# Patient Record
Sex: FEMALE | Race: OTHER | HISPANIC OR LATINO | Employment: STUDENT | URBAN - METROPOLITAN AREA
[De-identification: names, ages, dates, MRNs, and addresses within clinical notes are randomized per-mention and may not be internally consistent; named-entity substitution may affect disease eponyms.]

---
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
## 2023-01-04 ENCOUNTER — APPOINTMENT (OUTPATIENT)
Dept: LAB | Age: 18
End: 2023-01-04

## 2023-01-04 LAB
ALBUMIN SERPL BCP-MCNC: 3.4 G/DL (ref 3.5–5)
ALP SERPL-CCNC: 87 U/L (ref 46–384)
ALT SERPL W P-5'-P-CCNC: 26 U/L (ref 12–78)
ANION GAP SERPL CALCULATED.3IONS-SCNC: 9 MMOL/L (ref 4–13)
AST SERPL W P-5'-P-CCNC: 18 U/L (ref 5–45)
BASOPHILS # BLD AUTO: 0.01 THOUSANDS/ÂΜL (ref 0–0.1)
BASOPHILS NFR BLD AUTO: 0 % (ref 0–1)
BILIRUB SERPL-MCNC: 0.27 MG/DL (ref 0.2–1)
BUN SERPL-MCNC: 7 MG/DL (ref 5–25)
CALCIUM ALBUM COR SERPL-MCNC: 9.7 MG/DL (ref 8.3–10.1)
CALCIUM SERPL-MCNC: 9.2 MG/DL (ref 8.3–10.1)
CHLORIDE SERPL-SCNC: 108 MMOL/L (ref 96–108)
CO2 SERPL-SCNC: 22 MMOL/L (ref 21–32)
CREAT SERPL-MCNC: 0.62 MG/DL (ref 0.6–1.3)
EOSINOPHIL # BLD AUTO: 0.07 THOUSAND/ÂΜL (ref 0–0.61)
EOSINOPHIL NFR BLD AUTO: 1 % (ref 0–6)
ERYTHROCYTE [DISTWIDTH] IN BLOOD BY AUTOMATED COUNT: 12.2 % (ref 11.6–15.1)
GFR SERPL CREATININE-BSD FRML MDRD: 131 ML/MIN/1.73SQ M
GLUCOSE P FAST SERPL-MCNC: 143 MG/DL (ref 65–99)
HCT VFR BLD AUTO: 34.8 % (ref 34.8–46.1)
HGB BLD-MCNC: 11.3 G/DL (ref 11.5–15.4)
IMM GRANULOCYTES # BLD AUTO: 0.03 THOUSAND/UL (ref 0–0.2)
IMM GRANULOCYTES NFR BLD AUTO: 1 % (ref 0–2)
LYMPHOCYTES # BLD AUTO: 1.58 THOUSANDS/ÂΜL (ref 0.6–4.47)
LYMPHOCYTES NFR BLD AUTO: 24 % (ref 14–44)
MCH RBC QN AUTO: 29.7 PG (ref 26.8–34.3)
MCHC RBC AUTO-ENTMCNC: 32.5 G/DL (ref 31.4–37.4)
MCV RBC AUTO: 91 FL (ref 82–98)
MONOCYTES # BLD AUTO: 0.57 THOUSAND/ÂΜL (ref 0.17–1.22)
MONOCYTES NFR BLD AUTO: 9 % (ref 4–12)
NEUTROPHILS # BLD AUTO: 4.37 THOUSANDS/ÂΜL (ref 1.85–7.62)
NEUTS SEG NFR BLD AUTO: 65 % (ref 43–75)
NRBC BLD AUTO-RTO: 0 /100 WBCS
PLATELET # BLD AUTO: 243 THOUSANDS/UL (ref 149–390)
PMV BLD AUTO: 10.1 FL (ref 8.9–12.7)
POTASSIUM SERPL-SCNC: 3.3 MMOL/L (ref 3.5–5.3)
PROT SERPL-MCNC: 7.4 G/DL (ref 6.4–8.4)
RBC # BLD AUTO: 3.81 MILLION/UL (ref 3.81–5.12)
SODIUM SERPL-SCNC: 139 MMOL/L (ref 135–147)
WBC # BLD AUTO: 6.63 THOUSAND/UL (ref 4.31–10.16)

## 2023-01-05 ENCOUNTER — PATIENT OUTREACH (OUTPATIENT)
Dept: SURGERY | Facility: CLINIC | Age: 18
End: 2023-01-05

## 2023-01-05 LAB
BASOPHILS # BLD AUTO: 0 X10E3/UL (ref 0–0.2)
BASOPHILS NFR BLD AUTO: 1 %
CD3+CD4+ CELLS # BLD: 609 /UL (ref 359–1519)
CD3+CD4+ CELLS NFR BLD: 43.5 % (ref 30.8–58.5)
CD3+CD4+ CELLS/CD3+CD8+ CLL BLD: 1.34 % (ref 0.92–3.72)
CD3+CD8+ CELLS # BLD: 454 /UL (ref 109–897)
CD3+CD8+ CELLS NFR BLD: 32.4 % (ref 12–35.5)
EOSINOPHIL # BLD AUTO: 0.1 X10E3/UL (ref 0–0.4)
EOSINOPHIL NFR BLD AUTO: 1 %
ERYTHROCYTE [DISTWIDTH] IN BLOOD BY AUTOMATED COUNT: 12.7 % (ref 11.7–15.4)
HCT VFR BLD AUTO: 33.6 % (ref 34–46.6)
HGB BLD-MCNC: 11.2 G/DL (ref 11.1–15.9)
IMM GRANULOCYTES # BLD: 0 X10E3/UL (ref 0–0.1)
IMM GRANULOCYTES NFR BLD: 0 %
LYMPHOCYTES # BLD AUTO: 1.4 X10E3/UL (ref 0.7–3.1)
LYMPHOCYTES NFR BLD AUTO: 22 %
MCH RBC QN AUTO: 29.9 PG (ref 26.6–33)
MCHC RBC AUTO-ENTMCNC: 33.3 G/DL (ref 31.5–35.7)
MCV RBC AUTO: 90 FL (ref 79–97)
MONOCYTES # BLD AUTO: 0.6 X10E3/UL (ref 0.1–0.9)
MONOCYTES NFR BLD AUTO: 9 %
NEUTROPHILS # BLD AUTO: 4.2 X10E3/UL (ref 1.4–7)
NEUTROPHILS NFR BLD AUTO: 67 %
PLATELET # BLD AUTO: 230 X10E3/UL (ref 150–450)
RBC # BLD AUTO: 3.74 X10E6/UL (ref 3.77–5.28)
WBC # BLD AUTO: 6.2 X10E3/UL (ref 3.4–10.8)

## 2023-01-06 LAB
HIV1 RNA # SERPL NAA+PROBE: <20 COPIES/ML
HIV1 RNA SERPL NAA+PROBE-LOG#: NORMAL LOG10COPY/ML

## 2023-01-10 NOTE — PROGRESS NOTES
Ct called Cm to state that she would like her labs to be put in   Ct was discussed at team meeting and Cm notified team

## 2023-01-16 ENCOUNTER — TELEPHONE (OUTPATIENT)
Dept: SURGERY | Facility: CLINIC | Age: 18
End: 2023-01-16

## 2023-01-16 ENCOUNTER — PATIENT OUTREACH (OUTPATIENT)
Dept: SURGERY | Facility: CLINIC | Age: 18
End: 2023-01-16

## 2023-01-16 NOTE — PROGRESS NOTES
Ct reports to be a heterosexual with no sexual partners  Ct denies having sex for drugs or money  Ct also denies having sex while using drugs and alcohol  Ct denies drug or alcohol abuse  Ct reports to not have anyone to disclose HIV/STD status to  Ct denies being a victim of sexual abuse/assault  Ct reports to be going to [email or web address] for dental care  Ct reports to have dental insurance coverage  Ct reports to have gone to the dentist 12/23/2022 for a cavity fill  Ct denies any dental concerns  Ct reports to have [employer] for health insurance  Ct reports to have gotten labs done during the first week of January  Ct reports to be taking one medication pill for HIV  Ct reports to be taking Odefsey  Ct denies missing medication doses within the last month  Cm completed acuity scale with ct  Ct has scored a 12  This leaves ct at a level 1  See Attached  Ct has also inquired with cm about being sick since 12/30/2022  Ct has reported having a cough that has been lingering  Cm has recommended that ct goes to an express care within [facility] to inquire about cough

## 2023-01-16 NOTE — PROGRESS NOTES
Cm contacted ct after seeing a missed call from 01/14/2023  Ct informed that ct's inquiries were resolved with Medical Assistant  Cm acknowledged and inquired with ct about completing six month follow up  Ct reported to currently be at work and will get in contact with cm once work is over  Cm acknowledged

## 2023-01-16 NOTE — TELEPHONE ENCOUNTER
Pt left a message on Saturday saying she needs to get 2 shots for a volunteer opportunity, TB was one, and she said she  couldn't pronounce the other one  Ana Lazcano can you please call her back?  Thanks

## 2023-01-24 ENCOUNTER — OFFICE VISIT (OUTPATIENT)
Dept: SURGERY | Facility: CLINIC | Age: 18
End: 2023-01-24

## 2023-01-24 ENCOUNTER — PATIENT OUTREACH (OUTPATIENT)
Dept: SURGERY | Facility: CLINIC | Age: 18
End: 2023-01-24

## 2023-01-24 VITALS
HEART RATE: 91 BPM | TEMPERATURE: 99.2 F | HEIGHT: 65 IN | SYSTOLIC BLOOD PRESSURE: 111 MMHG | BODY MASS INDEX: 25.18 KG/M2 | DIASTOLIC BLOOD PRESSURE: 57 MMHG | WEIGHT: 151.1 LBS

## 2023-01-24 DIAGNOSIS — Z23 NEED FOR INFLUENZA VACCINATION: ICD-10-CM

## 2023-01-24 DIAGNOSIS — J06.9 UPPER RESPIRATORY TRACT INFECTION, UNSPECIFIED TYPE: ICD-10-CM

## 2023-01-24 DIAGNOSIS — E87.6 HYPOKALEMIA: ICD-10-CM

## 2023-01-24 DIAGNOSIS — D72.819 LEUKOPENIA, UNSPECIFIED TYPE: ICD-10-CM

## 2023-01-24 DIAGNOSIS — Z23 NEED FOR PNEUMOCOCCAL VACCINATION: ICD-10-CM

## 2023-01-24 DIAGNOSIS — B20 HIV INFECTION, UNSPECIFIED SYMPTOM STATUS (HCC): Primary | ICD-10-CM

## 2023-01-24 DIAGNOSIS — H66.91 CHRONIC INFECTION OF RIGHT EAR: ICD-10-CM

## 2023-01-24 NOTE — PROGRESS NOTES
Ct has came into office for assistance with completing benefits renewal  Ct got to formally meet with cm  Cm assisted ct with benefit renewal and has faxed over to [facility]  Cm has handed completed paperwork back to ct after faxing it to [facility]  Ct has also signed grievance policy and ct/cm agreement

## 2023-01-24 NOTE — PROGRESS NOTES
Progress Note - Infectious Disease   Vikash Vieira 21 y o  female MRN: [id number]  Unit/Bed#:  Encounter: [id number]      Impression/Plan:  1  HIV-congenitally infected   Doing well on Odefsey with an undetectable viral load and a CD4 count of 609  holding off on changing to injectable cabenuva for now  Continue odefsey, recheck labs in 2 months, follow-up in 3 months  Stressed adherence  Asked the patient to start setting her cell phone alarm for the same time of her dose daily     2  Recurrent otitis externa-resolved  Will monitor for now      3  Leukopenia-resolved  We will continue to monitor     4  Contact dermatitis-resolved  5   Upper respiratory infection  Now resolved  No additional work-up or treatment    6  Hypokalemia  Patient to increase fresh fruits and vegetables and will provide dietary information to the patient for foods higher in potassium such as bananas and potatoes      Patient was provided medication, adherence and prevention education    Subjective:  Routine follow-up for HIV  Patient claims 100% near adherence with Odefsey  She did have a period where she missed a number of doses when she was working and would fall asleep patient denies any notable side effects  Overall the feeling well  The patient denies any fever chills or sweats, denies any nausea vomiting or diarrhea, denies any cough or shortness of breath  Patient did have a prolonged respiratory illness with a long-term cough that now seems to have resolved  ROS:  A complete review of systems is negative other than that noted above in the subjective    Followup portions patient history reviewed and updated as:   Allergies, current medications, past medical history, past social history, past surgical history, and the problem list    Objective:  Vitals:  Vitals:    01/24/23 1615   BP: 111/57   Pulse: 91   Temp: 99 2 °F (37 3 °C)   Weight: 68 5 kg (151 lb 1 6 oz)   Height: 5' 5" (1 651 m) Physical Exam:   General Appearance:  Alert, interactive, appearing well,  nontoxic, no acute distress  Neck:   Supple without lymphadenopathy, no thyromegaly or masses   Throat: Oropharynx moist without lesions  Lungs:   Clear to auscultation bilaterally; no wheezes, rhonchi or rales; respirations unlabored   Heart:  RRR; no murmur, rub or gallop   Abdomen:   Soft, non-tender, non-distended, positive bowel sounds  Extremities: No clubbing, cyanosis or edema   Skin: No new rashes or lesions  No draining wounds noted  Labs, Imaging, & Other studies:   All pertinent labs and imaging studies were personally reviewed    Lab Results   Component Value Date    K 3 3 (L) 01/04/2023     01/04/2023    CO2 22 01/04/2023    BUN 7 01/04/2023    CREATININE 0 62 01/04/2023    GLUF 143 (H) 01/04/2023    CALCIUM 9 2 01/04/2023    CORRECTEDCA 9 7 01/04/2023    AST 18 01/04/2023    ALT 26 01/04/2023    ALKPHOS 87 01/04/2023    EGFR 131 01/04/2023     Lab Results   Component Value Date    WBC 6 63 01/04/2023    WBC 6 2 01/04/2023    HGB 11 3 (L) 01/04/2023    HGB 11 2 01/04/2023    HCT 34 8 01/04/2023    HCT 33 6 (L) 01/04/2023    MCV 91 01/04/2023    MCV 90 01/04/2023     01/04/2023     01/04/2023     Lab Results   Component Value Date    HEPCAB Non-reactive 05/14/2022     Lab Results   Component Value Date    HAV Reactive (A) 05/14/2022    HEPCAB Non-reactive 05/14/2022     Lab Results   Component Value Date    RPR Non-Reactive 05/14/2022     CD4 ABS   Date/Time Value Ref Range Status   01/04/2023 08:56  359 - 1519 /uL Final     HIV-1 RNA by PCR, Qn   Date/Time Value Ref Range Status   01/04/2023 08:56 AM <20 copies/mL Final     Comment:     HIV-1 RNA not detected  The reportable range for this assay is 20 to 10,000,000  copies HIV-1 RNA/mL             Current Outpatient Medications:   •  Acetaminophen-Caff-Pyrilamine 637-59-27 MG TABS, Take 1 tablet by mouth 2 (two) times a day as needed (for period symptoms), Disp: 60 tablet, Rfl: 2  •  Zmqqrzbsif-Zrkzilec-Fzrdlvu AF (Odefsey) tablet, Take 1 tablet by mouth daily, Disp: 30 tablet, Rfl: 5  •  melatonin 3 mg, TAKE 1 TABLET BY MOUTH DAILY AT BEDTIME, Disp: 30 tablet, Rfl: 0  •  ondansetron (ZOFRAN) 4 mg tablet, TAKE 1 TABLET BY MOUTH EVERY EIGHT HOURS AS NEEDED FOR NAUSEA OR VOMITING, Disp: 20 tablet, Rfl: 0  •  BinaxNOW COVID-19 Ag Home Test KIT, TEST AS DIRECTED TODAY (Patient not taking: Reported on 1/24/2023), Disp: , Rfl:

## 2023-01-26 ENCOUNTER — TELEPHONE (OUTPATIENT)
Dept: SURGERY | Facility: CLINIC | Age: 18
End: 2023-01-26

## 2023-01-26 NOTE — TELEPHONE ENCOUNTER
Pt called this morning because she is having a reaction to the vaccine she was given on Tuesday  She has apparently had reactions to the flu shot before, but feels this is worse than usual  Her are is swollen, and there is a rash, it's also painful, though she did say it hurts less today than yesterday

## 2023-02-10 ENCOUNTER — PATIENT OUTREACH (OUTPATIENT)
Dept: SURGERY | Facility: CLINIC | Age: 18
End: 2023-02-10

## 2023-02-10 DIAGNOSIS — B20 HIV INFECTION, UNSPECIFIED SYMPTOM STATUS (HCC): ICD-10-CM

## 2023-02-14 RX ORDER — EMTRICITABINE, RILPIVIRINE, AND TENOFOVIR ALAFENAMIDE 200; 25; 25 MG/1; MG/1; MG/1
1 TABLET ORAL DAILY
Qty: 30 TABLET | Refills: 5 | Status: SHIPPED | OUTPATIENT
Start: 2023-02-14

## 2023-02-21 ENCOUNTER — PATIENT OUTREACH (OUTPATIENT)
Dept: SURGERY | Facility: CLINIC | Age: 18
End: 2023-02-21

## 2023-03-02 ENCOUNTER — PATIENT OUTREACH (OUTPATIENT)
Dept: SURGERY | Facility: CLINIC | Age: 18
End: 2023-03-02

## 2023-03-10 ENCOUNTER — OFFICE VISIT (OUTPATIENT)
Dept: SURGERY | Facility: CLINIC | Age: 18
End: 2023-03-10

## 2023-03-10 ENCOUNTER — DOCUMENTATION (OUTPATIENT)
Dept: SURGERY | Facility: CLINIC | Age: 18
End: 2023-03-10

## 2023-03-10 VITALS
WEIGHT: 141 LBS | TEMPERATURE: 98.4 F | DIASTOLIC BLOOD PRESSURE: 60 MMHG | HEART RATE: 78 BPM | SYSTOLIC BLOOD PRESSURE: 100 MMHG | OXYGEN SATURATION: 100 % | HEIGHT: 65 IN | BODY MASS INDEX: 23.49 KG/M2

## 2023-03-10 DIAGNOSIS — R63.4 WEIGHT LOSS, UNINTENTIONAL: ICD-10-CM

## 2023-03-10 DIAGNOSIS — R11.0 NAUSEA: ICD-10-CM

## 2023-03-10 DIAGNOSIS — B20 HIV INFECTION, UNSPECIFIED SYMPTOM STATUS (HCC): Primary | ICD-10-CM

## 2023-03-10 DIAGNOSIS — Z23 NEED FOR HPV VACCINE: ICD-10-CM

## 2023-03-10 NOTE — ASSESSMENT & PLAN NOTE
No results found for: [id number]  CD4 ABS   Date/Time Value Ref Range Status   2023 08:56  359 - 1519 /uL Final     HIV-1 RNA by PCR, Qn   Date/Time Value Ref Range Status   2023 08:56 AM <20 copies/mL Final     Comment:     HIV-1 RNA not detected  The reportable range for this assay is 20 to 10,000,000  copies HIV-1 RNA/mL  HIV-1 RNA Viral Load Log   Date/Time Value Ref Range Status   2023 08:56 AM COMMENT [id number]/mL Final     Comment:     Unable to calculate result since non-numeric result obtained for  component test          ART: Donavan Leung        Denies side effects  Stressed the importance of adherence  Continue follow up with ID clinic  Reviewed most recent labs, including Cd4 and viral load  Discussed the risks and benefits of treatment options, instructions for management, importance of treatment adherence, and reduction of risk factor  Educated on possible  medication side effects  Counseled on routes of HIV transmission, including the risk of  infection  Emphasized that viral suppression is the best method to prevent HIV transmission  At this time pt denies the need for HIV testing of anyone in their life  Total encounter time was 45 minutes  Greater then 20 minutes were spent on counseling and patient education  Pt voices understanding and agreement with treatment plan

## 2023-03-10 NOTE — PROGRESS NOTES
Assessment/Plan:    Nausea  Educated to continue ondansetron as needed  Take medication 20 minutes prior to meals  Eat small meals as tolerated  Provided with sample nutritional supplement  Discussed the possibility of dietary intolerance of lactose or gluten  Advised to avoid lactose in her diet for the next 30 days and see if that makes a difference  Refer to dietitian for additional education  Weight loss, unintentional  Body mass index is 23 46 kg/m²  Wt Readings from Last 3 Encounters:   03/10/23 64 kg (141 lb) (70 %, Z= 0 53)*   01/24/23 68 5 kg (151 lb 1 6 oz) (81 %, Z= 0 88)*   08/19/22 72 1 kg (159 lb) (87 %, Z= 1 13)*     * Growth percentiles are based on CDC (Girls, 2-20 Years) data  Height: 5' 5" (165 1 cm)           Lab Results   Component Value Date    HGBA1C 5 0 05/14/2022     No results found for: GLUCOSE; To consume high-calorie snacks as tolerated  Given sample of nutritional supplement  Refer to GI       HIV (human immunodeficiency virus infection) ([facility], [place] [address] )  No results found for: [id number]  CD4 ABS   Date/Time Value Ref Range Status   01/04/2023 08:56  359 - 1519 /uL Final     HIV-1 RNA by PCR, Qn   Date/Time Value Ref Range Status   01/04/2023 08:56 AM <20 copies/mL Final     Comment:     HIV-1 RNA not detected  The reportable range for this assay is 20 to 10,000,000  copies HIV-1 RNA/mL  HIV-1 RNA Viral Load Log   Date/Time Value Ref Range Status   01/04/2023 08:56 AM COMMENT [id number]/mL Final     Comment:     Unable to calculate result since non-numeric result obtained for  component test          ART: Lindsay Day        Denies side effects  Stressed the importance of adherence  Continue follow up with ID clinic  Reviewed most recent labs, including Cd4 and viral load  Discussed the risks and benefits of treatment options, instructions for management, importance of treatment adherence, and reduction of risk factor  Educated on possible  medication side effects  Counseled on routes of HIV transmission, including the risk of  infection  Emphasized that viral suppression is the best method to prevent HIV transmission  At this time pt denies the need for HIV testing of anyone in their life  Total encounter time was 45 minutes  Greater then 20 minutes were spent on counseling and patient education  Pt voices understanding and agreement with treatment plan  Diagnoses and all orders for this visit:    HIV infection, unspecified symptom status ([facility] [address] )    Need for HPV vaccine  -     HPV VACCINE 9 VALENT IM    Nausea  -     CBC and differential; Future  -     Ferritin; Future  -     Vitamin B12; Future  -     Vitamin D 25 hydroxy; Future  -     Ambulatory referral to Gastroenterology; Future  -     Comprehensive metabolic panel; Future  -     Ambulatory referral to Gastroenterology; Future  -     TSH, 3rd generation with Free T4 reflex; Future  -     HEMOGLOBIN A1C W/ EAG ESTIMATION; Future  -     Lipase; Future    Weight loss, unintentional  -     CBC and differential; Future  -     Ferritin; Future  -     Vitamin B12; Future  -     Vitamin D 25 hydroxy; Future  -     Ambulatory referral to Gastroenterology; Future  -     Comprehensive metabolic panel; Future  -     Ambulatory referral to Gastroenterology; Future  -     TSH, 3rd generation with Free T4 reflex; Future  -     HEMOGLOBIN A1C W/ EAG ESTIMATION; Future  -     Lipase; Future          Subjective:      Patient ID: Fide Wylie is a 21 y o  female  Shannan Sweeney is a pleasant 21year old female who presents to the clinic today for PCP follow-up visit  Past medical history significant for HIV and chronic otitis externa  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  She is complaining of persistent nausea with unintentional weight loss of 10 pounds over the past 6 months  She denies vomiting, diarrhea, abdominal pain, or fever  This has been an ongoing issue for her    Previously she complained of nausea with her menstrual cycles and was prescribed Zofran which was effective  Now she reports increased nauseousness and decreased appetite with intolerability to protein  She does not have food insecurity or history of anorexia/bulimia  She does have a family history of maternal grandmother with rectal cancer and paternal grandmother with stomach cancer  She has never had a formal GI work-up  The following portions of the patient's history were reviewed and updated as appropriate: allergies, current medications, past family history, past medical history, past social history, past surgical history and problem list     Review of Systems   Constitutional: Positive for appetite change and unexpected weight change  Negative for activity change, chills, fatigue and fever  HENT: Negative for ear pain and sore throat  Eyes: Negative for pain and visual disturbance  Respiratory: Negative for cough and shortness of breath  Cardiovascular: Negative for chest pain and palpitations  Gastrointestinal: Positive for nausea  Negative for abdominal distention, abdominal pain, constipation, diarrhea and vomiting  Genitourinary: Negative for dysuria and hematuria  Musculoskeletal: Negative for arthralgias and back pain  Skin: Negative for color change and rash  Neurological: Negative for seizures and syncope  All other systems reviewed and are negative          Objective:      /60   Pulse 78   Temp 98 4 °F (36 9 °C)   Ht 5' 5" (1 651 m)   Wt 64 kg (141 lb)   SpO2 100%   BMI 23 46 kg/m²       Lab Results   Component Value Date    K 3 3 (L) 01/04/2023     01/04/2023    CO2 22 01/04/2023    BUN 7 01/04/2023    CREATININE 0 62 01/04/2023    GLUF 143 (H) 01/04/2023    CALCIUM 9 2 01/04/2023    CORRECTEDCA 9 7 01/04/2023    AST 18 01/04/2023    ALT 26 01/04/2023    ALKPHOS 87 01/04/2023    EGFR 131 01/04/2023     Lab Results   Component Value Date    WBC 6 63 01/04/2023    WBC 6 2 01/04/2023    HGB 11 3 (L) 01/04/2023    HGB 11 2 01/04/2023    HCT 34 8 01/04/2023    HCT 33 6 (L) 01/04/2023    MCV 91 01/04/2023    MCV 90 01/04/2023     01/04/2023     01/04/2023     Lab Results   Component Value Date    HEPCAB Non-reactive 05/14/2022     Lab Results   Component Value Date    HAV Reactive (A) 05/14/2022    HEPCAB Non-reactive 05/14/2022     Lab Results   Component Value Date    RPR Non-Reactive 05/14/2022            Physical Exam  Constitutional:       General: She is not in acute distress  Appearance: She is well-developed  HENT:      Head: Normocephalic and atraumatic  Right Ear: External ear normal       Left Ear: External ear normal       Nose: Nose normal       Mouth/Throat:      Pharynx: No oropharyngeal exudate  Eyes:      General:         Right eye: No discharge  Left eye: No discharge  Conjunctiva/sclera: Conjunctivae normal       Pupils: Pupils are equal, round, and reactive to light  Neck:      Thyroid: No thyromegaly  Cardiovascular:      Rate and Rhythm: Normal rate and regular rhythm  Heart sounds: Normal heart sounds  No murmur heard  Pulmonary:      Effort: Pulmonary effort is normal       Breath sounds: Normal breath sounds  No wheezing  Abdominal:      General: Bowel sounds are normal       Palpations: Abdomen is soft  There is no mass  Tenderness: There is no abdominal tenderness  Musculoskeletal:         General: No tenderness  Normal range of motion  Cervical back: Normal range of motion  Lymphadenopathy:      Cervical: No cervical adenopathy  Skin:     General: Skin is warm and dry  Findings: No rash  Neurological:      Mental Status: She is alert and oriented to person, place, and time     Psychiatric:         Behavior: Behavior normal

## 2023-03-10 NOTE — ASSESSMENT & PLAN NOTE
Body mass index is 23 46 kg/m²  Wt Readings from Last 3 Encounters:   03/10/23 64 kg (141 lb) (70 %, Z= 0 53)*   01/24/23 68 5 kg (151 lb 1 6 oz) (81 %, Z= 0 88)*   08/19/22 72 1 kg (159 lb) (87 %, Z= 1 13)*     * Growth percentiles are based on CDC (Girls, 2-20 Years) data  Height: 5' 5" (165 1 cm)           Lab Results   Component Value Date    HGBA1C 5 0 05/14/2022     No results found for: GLUCOSE; To consume high-calorie snacks as tolerated  Given sample of nutritional supplement  Refer to GI

## 2023-03-10 NOTE — ASSESSMENT & PLAN NOTE
Educated to continue ondansetron as needed  Take medication 20 minutes prior to meals  Eat small meals as tolerated  Provided with sample nutritional supplement  Discussed the possibility of dietary intolerance of lactose or gluten  Advised to avoid lactose in her diet for the next 30 days and see if that makes a difference  Refer to dietitian for additional education

## 2023-03-10 NOTE — PROGRESS NOTES
Assessment    Vikash seen by RD in conjunction with PCP f/u   pt is established patient (last annual was 05/2022)      Clinical Data/Client History    CD4 count:  609  Viral load:  <20  ART:   [id number] [facility]      , Patient Navigator: [id number] Jessica Manleyd assistance: No issues with food access     Living situation: House or apartment     Mobility:  self ambulates    Physical activity: admitted she has not exercised lately     Oral problems: denied difficulties chewing and swallowing       Typical food/beverage intake:    · Breakfast pancakes with eggs   · Lunch chicken & rice, sometimes [employer]   · Dinner skips   · Beverages water, ginger ale, admits she does not drink very much water     Appetite: Good    Vitamin, mineral, herbal supplements: N/A    Oral/enteral nutrition supplements:  N/A    GI problems: nausea and denies any C/D    Food allergies/intolerances:  None reported     Weight history: Wt Readings from Last 3 Encounters:   03/10/23 64 kg (141 lb) (70 %, Z= 0 53)*   01/24/23 68 5 kg (151 lb 1 6 oz) (81 %, Z= 0 88)*   08/19/22 72 1 kg (159 lb) (87 %, Z= 1 13)*     * Growth percentiles are based on CDC (Girls, 2-20 Years) data       Current body weight:  141  Height:  65"  BMI:  23(healthy for age group)   IBW:  125  %IBW  112%    Weight change: -10#(6%) x 2 months, -18#(11%) ~6 months     Clinically significant/severe: unintentional weight loss noted    Nutrition-related labs:    Lab Results   Component Value Date    SODIUM 139 01/04/2023    K 3 3 (L) 01/04/2023     01/04/2023    CO2 22 01/04/2023    BUN 7 01/04/2023    CREATININE 0 62 01/04/2023    GLUC 100 07/06/2022    CALCIUM 9 2 01/04/2023     Lab Results   Component Value Date    HGBA1C 5 0 05/14/2022       Nutrition-related medications: Zofran    Nutrition Diagnosis    Problem: unintended weight loss    Related to: inadequate PO intake     As Evidenced By: Diet recall, nausea, significant weight loss       Estimated Nutritional Needs    [facility] REE: (64kg)     · ~3547-2605 kcal (based on: 25-30kca/kg)  · ~64 protein (based on: 1g/kg)  · ~1600ml fluid (based on: 25ml/kg)      Current intake estimation: not meeting needs      Nutrition Intervention/Recommendations    Nutrition education intervention: provided    Nutrition recommendations: Drink more fluids, take zofran before meals to prevent nausea     Supplement recommendations: supplement samples provided     Teaching Method: verbal     Person Educated: Pt & parent    Comprehension: excellent    Receptivity: excellent    Expected compliance: good      Goals:  Weight stability, decreased nausea   Monitoring/Evaluation:  Will continue to monitor weight trend, appetite, GI issues, lab trend and need for nutrition education  Visit Summary  Significant unintended weight loss noted  PT reports eating less r/t nausea  Pt does not report any GI symptoms(vominting,constipation or diarrhea)  PT does report nausea with meat products but denies any other food intolerances  RD suggested drinking more fluids with electrolytes and taking anti-nausea medication before meals  Labs on order to check for any underlying conditions  Will continue to provided nutrition support as needed  Supplement samples provided     Jesica Mata RDN,LDN

## 2023-04-03 ENCOUNTER — TELEPHONE (OUTPATIENT)
Dept: SURGERY | Facility: CLINIC | Age: 18
End: 2023-04-03

## 2023-04-03 ENCOUNTER — TELEPHONE (OUTPATIENT)
Dept: GASTROENTEROLOGY | Facility: CLINIC | Age: 18
End: 2023-04-03

## 2023-04-03 NOTE — TELEPHONE ENCOUNTER
----- Message from Lenora Bowman sent at 3/31/2023  3:34 PM EDT -----  Regarding: labs  Pt needs labs done by Friday 04/07/23 for appt with dr Bebeto Hall on 04/18/23  87

## 2023-04-03 NOTE — TELEPHONE ENCOUNTER
Called and lvm for the patient to call me back and be rescheduled from her cancelled appointment on 4/5/23

## 2023-04-07 ENCOUNTER — APPOINTMENT (OUTPATIENT)
Dept: LAB | Age: 18
End: 2023-04-07

## 2023-04-07 ENCOUNTER — PATIENT OUTREACH (OUTPATIENT)
Dept: SURGERY | Facility: CLINIC | Age: 18
End: 2023-04-07

## 2023-04-07 DIAGNOSIS — R63.4 WEIGHT LOSS, UNINTENTIONAL: ICD-10-CM

## 2023-04-07 DIAGNOSIS — R11.0 NAUSEA: ICD-10-CM

## 2023-04-07 LAB
25(OH)D3 SERPL-MCNC: 19 NG/ML (ref 30–100)
ALBUMIN SERPL BCP-MCNC: 3.9 G/DL (ref 3.5–5)
ALP SERPL-CCNC: 68 U/L (ref 46–116)
ALT SERPL W P-5'-P-CCNC: 15 U/L (ref 12–78)
ANION GAP SERPL CALCULATED.3IONS-SCNC: 3 MMOL/L (ref 4–13)
AST SERPL W P-5'-P-CCNC: 12 U/L (ref 5–45)
BASOPHILS # BLD AUTO: 0.02 THOUSANDS/ÂΜL (ref 0–0.1)
BASOPHILS NFR BLD AUTO: 1 % (ref 0–1)
BILIRUB SERPL-MCNC: 0.49 MG/DL (ref 0.2–1)
BUN SERPL-MCNC: 8 MG/DL (ref 5–25)
CALCIUM SERPL-MCNC: 9.4 MG/DL (ref 8.3–10.1)
CHLORIDE SERPL-SCNC: 109 MMOL/L (ref 96–108)
CO2 SERPL-SCNC: 24 MMOL/L (ref 21–32)
CREAT SERPL-MCNC: 0.62 MG/DL (ref 0.6–1.3)
EOSINOPHIL # BLD AUTO: 0.02 THOUSAND/ÂΜL (ref 0–0.61)
EOSINOPHIL NFR BLD AUTO: 1 % (ref 0–6)
ERYTHROCYTE [DISTWIDTH] IN BLOOD BY AUTOMATED COUNT: 12.3 % (ref 11.6–15.1)
FERRITIN SERPL-MCNC: 13 NG/ML (ref 8–388)
GFR SERPL CREATININE-BSD FRML MDRD: 130 ML/MIN/1.73SQ M
GLUCOSE P FAST SERPL-MCNC: 83 MG/DL (ref 65–99)
HCT VFR BLD AUTO: 36.2 % (ref 34.8–46.1)
HGB BLD-MCNC: 11.9 G/DL (ref 11.5–15.4)
IMM GRANULOCYTES # BLD AUTO: 0.01 THOUSAND/UL (ref 0–0.2)
IMM GRANULOCYTES NFR BLD AUTO: 0 % (ref 0–2)
LIPASE SERPL-CCNC: 252 U/L (ref 73–393)
LYMPHOCYTES # BLD AUTO: 1.71 THOUSANDS/ÂΜL (ref 0.6–4.47)
LYMPHOCYTES NFR BLD AUTO: 49 % (ref 14–44)
MCH RBC QN AUTO: 30.6 PG (ref 26.8–34.3)
MCHC RBC AUTO-ENTMCNC: 32.9 G/DL (ref 31.4–37.4)
MCV RBC AUTO: 93 FL (ref 82–98)
MONOCYTES # BLD AUTO: 0.3 THOUSAND/ÂΜL (ref 0.17–1.22)
MONOCYTES NFR BLD AUTO: 9 % (ref 4–12)
NEUTROPHILS # BLD AUTO: 1.39 THOUSANDS/ÂΜL (ref 1.85–7.62)
NEUTS SEG NFR BLD AUTO: 40 % (ref 43–75)
NRBC BLD AUTO-RTO: 0 /100 WBCS
PLATELET # BLD AUTO: 257 THOUSANDS/UL (ref 149–390)
PMV BLD AUTO: 10.4 FL (ref 8.9–12.7)
POTASSIUM SERPL-SCNC: 3.9 MMOL/L (ref 3.5–5.3)
PROT SERPL-MCNC: 7.5 G/DL (ref 6.4–8.4)
RBC # BLD AUTO: 3.89 MILLION/UL (ref 3.81–5.12)
SODIUM SERPL-SCNC: 136 MMOL/L (ref 135–147)
TSH SERPL DL<=0.05 MIU/L-ACNC: 1.04 UIU/ML (ref 0.45–4.5)
VIT B12 SERPL-MCNC: 468 PG/ML (ref 100–900)
WBC # BLD AUTO: 3.45 THOUSAND/UL (ref 4.31–10.16)

## 2023-04-07 NOTE — PROGRESS NOTES
Ct has gotten in contact with ct inquiring about processing name change  Ct has reported to receive [employer] which has resulted in name change  Cm informed that cm can assist ct with this name change  Ct came in to report name change with [employer] and the [facility]  Cm has gotten in [facility] [employer] to report name change primarily  Ct gave cm authorization to speak with [employer]  Cm and Ct have also informed that they wanted to update ct's PCP on ct's record  Ct's PCP was updated  Ct and cm were then transferred to [facility] to report the name change  [facility] has given cm fax number for documentation of name change to be faxed  Cm has faxed documentation  Cm and ct have also been informed that ct will receive letter requesting documentation for name change  Ct and cm have acknowledged  Cm has informed name change document has been faxed

## 2023-04-08 LAB
BASOPHILS # BLD AUTO: 0 X10E3/UL (ref 0–0.2)
BASOPHILS NFR BLD AUTO: 0 %
CD3+CD4+ CELLS # BLD: 610 /UL (ref 359–1519)
CD3+CD4+ CELLS NFR BLD: 35.9 % (ref 30.8–58.5)
CD3+CD4+ CELLS/CD3+CD8+ CLL BLD: 1.04 % (ref 0.92–3.72)
CD3+CD8+ CELLS # BLD: 585 /UL (ref 109–897)
CD3+CD8+ CELLS NFR BLD: 34.4 % (ref 12–35.5)
EOSINOPHIL # BLD AUTO: 0 X10E3/UL (ref 0–0.4)
EOSINOPHIL NFR BLD AUTO: 1 %
ERYTHROCYTE [DISTWIDTH] IN BLOOD BY AUTOMATED COUNT: 12.2 % (ref 11.7–15.4)
EST. AVERAGE GLUCOSE BLD GHB EST-MCNC: 94 MG/DL
HBA1C MFR BLD: 4.9 %
HCT VFR BLD AUTO: 36.3 % (ref 34–46.6)
HGB BLD-MCNC: 11.9 G/DL (ref 11.1–15.9)
HIV1 RNA # SERPL NAA+PROBE: <20 COPIES/ML
HIV1 RNA SERPL NAA+PROBE-LOG#: NORMAL LOG10COPY/ML
IMM GRANULOCYTES # BLD: 0 X10E3/UL (ref 0–0.1)
IMM GRANULOCYTES NFR BLD: 0 %
LYMPHOCYTES # BLD AUTO: 1.7 X10E3/UL (ref 0.7–3.1)
LYMPHOCYTES NFR BLD AUTO: 49 %
MCH RBC QN AUTO: 30.7 PG (ref 26.6–33)
MCHC RBC AUTO-ENTMCNC: 32.8 G/DL (ref 31.5–35.7)
MCV RBC AUTO: 94 FL (ref 79–97)
MONOCYTES # BLD AUTO: 0.3 X10E3/UL (ref 0.1–0.9)
MONOCYTES NFR BLD AUTO: 8 %
NEUTROPHILS # BLD AUTO: 1.4 X10E3/UL (ref 1.4–7)
NEUTROPHILS NFR BLD AUTO: 42 %
PLATELET # BLD AUTO: 265 X10E3/UL (ref 150–450)
RBC # BLD AUTO: 3.88 X10E6/UL (ref 3.77–5.28)
WBC # BLD AUTO: 3.4 X10E3/UL (ref 3.4–10.8)